# Patient Record
Sex: MALE
[De-identification: names, ages, dates, MRNs, and addresses within clinical notes are randomized per-mention and may not be internally consistent; named-entity substitution may affect disease eponyms.]

---

## 2024-08-09 PROBLEM — Z00.00 ENCOUNTER FOR PREVENTIVE HEALTH EXAMINATION: Status: ACTIVE | Noted: 2024-08-09

## 2024-08-15 PROBLEM — N18.6 ESRD ON HEMODIALYSIS: Status: ACTIVE | Noted: 2024-08-15

## 2024-08-15 PROBLEM — I10 HTN (HYPERTENSION): Status: ACTIVE | Noted: 2024-08-15

## 2024-08-15 NOTE — REASON FOR VISIT
[Other ___] : a [unfilled] visit for [Difficult Cannulation] : difficult cannulation [High Venous Pressure] : high venous pressure

## 2024-08-16 ENCOUNTER — RESULT REVIEW (OUTPATIENT)
Age: 54
End: 2024-08-16

## 2024-08-16 ENCOUNTER — APPOINTMENT (OUTPATIENT)
Dept: ENDOVASCULAR SURGERY | Facility: CLINIC | Age: 54
End: 2024-08-16
Payer: MEDICAID

## 2024-08-16 VITALS
TEMPERATURE: 98.1 F | RESPIRATION RATE: 16 BRPM | HEIGHT: 68 IN | SYSTOLIC BLOOD PRESSURE: 131 MMHG | BODY MASS INDEX: 30.07 KG/M2 | WEIGHT: 198.41 LBS | HEART RATE: 68 BPM | OXYGEN SATURATION: 99 % | DIASTOLIC BLOOD PRESSURE: 77 MMHG

## 2024-08-16 DIAGNOSIS — T82.898A OTHER SPECIFIED COMPLICATION OF VASCULAR PROSTHETIC DEVICES, IMPLANTS AND GRAFTS, INITIAL ENCOUNTER: ICD-10-CM

## 2024-08-16 DIAGNOSIS — I50.9 HEART FAILURE, UNSPECIFIED: ICD-10-CM

## 2024-08-16 DIAGNOSIS — N18.6 END STAGE RENAL DISEASE: ICD-10-CM

## 2024-08-16 DIAGNOSIS — Z99.2 END STAGE RENAL DISEASE: ICD-10-CM

## 2024-08-16 DIAGNOSIS — I10 ESSENTIAL (PRIMARY) HYPERTENSION: ICD-10-CM

## 2024-08-16 PROCEDURE — 36907Z: CUSTOM | Mod: 59

## 2024-08-16 PROCEDURE — 36902Z: CUSTOM

## 2024-08-16 RX ORDER — CINACALCET 30 MG/1
30 TABLET ORAL
Refills: 0 | Status: DISCONTINUED | COMMUNITY
End: 2024-08-16

## 2024-08-16 RX ORDER — ASPIRIN 81 MG
81 TABLET, DELAYED RELEASE (ENTERIC COATED) ORAL
Refills: 0 | Status: ACTIVE | COMMUNITY

## 2024-08-16 RX ORDER — GABAPENTIN 300 MG
300 TABLET ORAL
Refills: 0 | Status: ACTIVE | COMMUNITY

## 2024-08-16 RX ORDER — SUCROFERRIC OXYHYDROXIDE 500 MG/1
500 TABLET, CHEWABLE ORAL
Refills: 0 | Status: ACTIVE | COMMUNITY

## 2024-08-16 RX ORDER — ATORVASTATIN CALCIUM 20 MG/1
20 TABLET, FILM COATED ORAL
Refills: 0 | Status: ACTIVE | COMMUNITY

## 2024-08-16 RX ORDER — CLONIDINE HYDROCHLORIDE 0.2 MG/1
0.2 TABLET ORAL
Refills: 0 | Status: DISCONTINUED | COMMUNITY
End: 2024-08-16

## 2024-08-16 RX ORDER — NIFEDIPINE 60 MG
60 TABLET, EXTENDED RELEASE ORAL
Refills: 0 | Status: ACTIVE | COMMUNITY

## 2024-08-16 RX ORDER — CARVEDILOL 6.25 MG/1
6.25 TABLET, FILM COATED ORAL
Refills: 0 | Status: ACTIVE | COMMUNITY

## 2024-08-16 RX ORDER — SACUBITRIL AND VALSARTAN 49; 51 MG/1; MG/1
49-51 TABLET, FILM COATED ORAL
Refills: 0 | Status: ACTIVE | COMMUNITY

## 2024-08-16 NOTE — PAST MEDICAL HISTORY
[Increasing age ( >40 years old)] : Increasing age ( >40 years old) [No therapy indicated for cases scheduled for less than one hour] : No therapy indicated for cases scheduled for less than one hour. [FreeTextEntry1] : Malignant Hyperthermia Screening Tool and Risk of Bleeding Assessment  Mr. ELVER CORREA denies family history of unexpected death following Anesthesia or Exercise. Denies Family history of Malignant Hyperthermia, Muscle or Neuromuscular disorder and High Temperature following exercise.  Mr. ELVER CORREA denies history of Muscle Spasm, Dark or Chocolate - Colored urine and Unanticipated fever immediately following anesthesia or serious exercise.  Mr. CORREA also denies bleeding tendencies/ Risks of Bleeding.

## 2024-08-16 NOTE — ASSESSMENT
[FreeTextEntry1] : referred from dialysis for high venous pressures and difficult cannulation-plan for fistulogram and possible intervention

## 2024-08-16 NOTE — PROCEDURE
[Resume diet] : resume diet [Site check for bleeding/hematoma/thrill/bruit] : Site check for bleeding/hematoma/thrill/bruit [Vital signs on admission the q 15 mins x2] : Vital signs on admission the q 15 mins x2 [FreeTextEntry1] : left arm fistula fistulogram /angioplasty

## 2024-08-16 NOTE — HISTORY OF PRESENT ILLNESS
[] : left radiocephalic fistula [FreeTextEntry1] : 6/11/2018 Dr. Hurtado [FreeTextEntry4] : yesterday  [FreeTextEntry5] : yesterday at 12midnight [FreeTextEntry6] : Dr. Whitlock

## 2024-09-06 ENCOUNTER — OUTPATIENT (OUTPATIENT)
Dept: OUTPATIENT SERVICES | Facility: HOSPITAL | Age: 54
LOS: 1 days | End: 2024-09-06
Payer: MEDICAID

## 2024-09-06 VITALS — WEIGHT: 195.99 LBS | HEIGHT: 68 IN

## 2024-09-06 DIAGNOSIS — Z98.890 OTHER SPECIFIED POSTPROCEDURAL STATES: Chronic | ICD-10-CM

## 2024-09-06 DIAGNOSIS — Z01.818 ENCOUNTER FOR OTHER PREPROCEDURAL EXAMINATION: ICD-10-CM

## 2024-09-06 DIAGNOSIS — T82.898A OTHER SPECIFIED COMPLICATION OF VASCULAR PROSTHETIC DEVICES, IMPLANTS AND GRAFTS, INITIAL ENCOUNTER: ICD-10-CM

## 2024-09-06 DIAGNOSIS — I10 ESSENTIAL (PRIMARY) HYPERTENSION: ICD-10-CM

## 2024-09-06 LAB
ANION GAP SERPL CALC-SCNC: 14 MMOL/L — SIGNIFICANT CHANGE UP (ref 5–17)
BUN SERPL-MCNC: 41 MG/DL — HIGH (ref 7–23)
CALCIUM SERPL-MCNC: 11.1 MG/DL — HIGH (ref 8.4–10.5)
CHLORIDE SERPL-SCNC: 94 MMOL/L — LOW (ref 96–108)
CO2 SERPL-SCNC: 28 MMOL/L — SIGNIFICANT CHANGE UP (ref 22–31)
CREAT SERPL-MCNC: 9.21 MG/DL — HIGH (ref 0.5–1.3)
EGFR: 6 ML/MIN/1.73M2 — LOW
GLUCOSE SERPL-MCNC: 101 MG/DL — HIGH (ref 70–99)
HCT VFR BLD CALC: 39.9 % — SIGNIFICANT CHANGE UP (ref 39–50)
HCV AB S/CO SERPL IA: 0.05 S/CO — SIGNIFICANT CHANGE UP
HCV AB SERPL-IMP: SIGNIFICANT CHANGE UP
HGB BLD-MCNC: 12.8 G/DL — LOW (ref 13–17)
MCHC RBC-ENTMCNC: 30.4 PG — SIGNIFICANT CHANGE UP (ref 27–34)
MCHC RBC-ENTMCNC: 32.1 GM/DL — SIGNIFICANT CHANGE UP (ref 32–36)
MCV RBC AUTO: 94.8 FL — SIGNIFICANT CHANGE UP (ref 80–100)
NRBC # BLD: 0 /100 WBCS — SIGNIFICANT CHANGE UP (ref 0–0)
PLATELET # BLD AUTO: 169 K/UL — SIGNIFICANT CHANGE UP (ref 150–400)
POTASSIUM SERPL-MCNC: 3.8 MMOL/L — SIGNIFICANT CHANGE UP (ref 3.5–5.3)
POTASSIUM SERPL-SCNC: 3.8 MMOL/L — SIGNIFICANT CHANGE UP (ref 3.5–5.3)
RBC # BLD: 4.21 M/UL — SIGNIFICANT CHANGE UP (ref 4.2–5.8)
RBC # FLD: 14.6 % — HIGH (ref 10.3–14.5)
SODIUM SERPL-SCNC: 136 MMOL/L — SIGNIFICANT CHANGE UP (ref 135–145)
WBC # BLD: 6.44 K/UL — SIGNIFICANT CHANGE UP (ref 3.8–10.5)
WBC # FLD AUTO: 6.44 K/UL — SIGNIFICANT CHANGE UP (ref 3.8–10.5)

## 2024-09-06 PROCEDURE — 80048 BASIC METABOLIC PNL TOTAL CA: CPT

## 2024-09-06 PROCEDURE — 36415 COLL VENOUS BLD VENIPUNCTURE: CPT

## 2024-09-06 PROCEDURE — G0463: CPT

## 2024-09-06 PROCEDURE — 86803 HEPATITIS C AB TEST: CPT

## 2024-09-06 PROCEDURE — 85027 COMPLETE CBC AUTOMATED: CPT

## 2024-09-06 RX ORDER — SACUBITRIL AND VALSARTAN 49; 51 MG/1; MG/1
0 TABLET, FILM COATED ORAL
Qty: 0 | Refills: 1 | DISCHARGE

## 2024-09-06 NOTE — H&P PST ADULT - ASSESSMENT
Airway:  normal    Mallampati-     2  Dental: Patient denies loose teeth    Activity :  dasi mets :

## 2024-09-06 NOTE — H&P PST ADULT - NSANTHOSAYNRD_GEN_A_CORE
No. SIVAKUMAR screening performed.  STOP BANG Legend: 0-2 = LOW Risk; 3-4 = INTERMEDIATE Risk; 5-8 = HIGH Risk

## 2024-09-06 NOTE — H&P PST ADULT - MUSCULOSKELETAL
+thrill +bruit LUE/ROM intact/no joint swelling/no joint erythema/no joint warmth/normal gait/no chest wall tenderness details…

## 2024-09-06 NOTE — H&P PST ADULT - PROBLEM SELECTOR PLAN 2
Continue on antihypertensive medication   continue on aspirin 81 mg throughout the leslie operative period

## 2024-09-06 NOTE — H&P PST ADULT - HISTORY OF PRESENT ILLNESS
54 year old male presents to PST prior to scheduled revision of left arm AV fistula on 9/11/24 with Dr Arceo.   Fistula placed June 2018 (Dr. Hurtado)   PMHx includes ESRD, HTN, HLD,

## 2024-09-06 NOTE — H&P PST ADULT - PROBLEM SELECTOR PLAN 1
Revision of left arm AV fistula with repair of aneurysm  CBC BMP in PST   Instructions, diet and chlorhexidine soap reviewed and provided to patient   all questions answered during exam

## 2024-09-06 NOTE — H&P PST ADULT - RESPIRATORY
clear to auscultation bilaterally/no wheezes/no rales/no rhonchi/no respiratory distress/no use of accessory muscles/no subcutaneous emphysema/airway patent/good air movement/respirations non-labored/no intercostal retractions

## 2024-09-10 ENCOUNTER — RESULT REVIEW (OUTPATIENT)
Age: 54
End: 2024-09-10

## 2024-09-10 ENCOUNTER — APPOINTMENT (OUTPATIENT)
Dept: ENDOVASCULAR SURGERY | Facility: CLINIC | Age: 54
End: 2024-09-10
Payer: MEDICAID

## 2024-09-10 ENCOUNTER — TRANSCRIPTION ENCOUNTER (OUTPATIENT)
Age: 54
End: 2024-09-10

## 2024-09-10 VITALS
BODY MASS INDEX: 30.07 KG/M2 | RESPIRATION RATE: 18 BRPM | HEART RATE: 58 BPM | WEIGHT: 198.41 LBS | HEIGHT: 68 IN | DIASTOLIC BLOOD PRESSURE: 78 MMHG | TEMPERATURE: 98.6 F | SYSTOLIC BLOOD PRESSURE: 160 MMHG | OXYGEN SATURATION: 100 %

## 2024-09-10 DIAGNOSIS — Z99.2 END STAGE RENAL DISEASE: ICD-10-CM

## 2024-09-10 DIAGNOSIS — N18.6 END STAGE RENAL DISEASE: ICD-10-CM

## 2024-09-10 PROCEDURE — 76937 US GUIDE VASCULAR ACCESS: CPT

## 2024-09-10 PROCEDURE — 36558 INSERT TUNNELED CV CATH: CPT | Mod: RT

## 2024-09-10 PROCEDURE — 77001 FLUOROGUIDE FOR VEIN DEVICE: CPT

## 2024-09-10 NOTE — HISTORY OF PRESENT ILLNESS
[] : left radiocephalic fistula [FreeTextEntry1] : 6/11/2018 Dr. Hurtado [FreeTextEntry4] : Saturday  [FreeTextEntry5] : yesterday at 10:30pm  [FreeTextEntry6] : Dr. Grover

## 2024-09-10 NOTE — PROCEDURE
[D/C IV on discharge] : D/C IV on discharge [Resume diet] : resume diet [Site check for bleeding/hematoma] : Site check for bleeding/hematoma [Vital signs on admission the q 15 mins x2] : Vital signs on admission the q 15 mins x2 [FreeTextEntry1] : right tunneled catheter insertion

## 2024-09-10 NOTE — REASON FOR VISIT
[Other ___] : a [unfilled] visit for [Family Member] : family member [FreeTextEntry2] : tunneled catheter insertion

## 2024-09-10 NOTE — ASSESSMENT
[FreeTextEntry1] : scheduled for fistula surgery-plan for tunneled catheter insertion for dialysis
[FreeTextEntry1] : scheduled for fistula surgery-plan for tunneled catheter insertion for dialysis
within normal limits

## 2024-09-11 ENCOUNTER — TRANSCRIPTION ENCOUNTER (OUTPATIENT)
Age: 54
End: 2024-09-11

## 2024-09-11 ENCOUNTER — APPOINTMENT (OUTPATIENT)
Dept: VASCULAR SURGERY | Facility: HOSPITAL | Age: 54
End: 2024-09-11
Payer: MEDICAID

## 2024-09-11 ENCOUNTER — OUTPATIENT (OUTPATIENT)
Dept: INPATIENT UNIT | Facility: HOSPITAL | Age: 54
LOS: 1 days | End: 2024-09-11
Payer: MEDICAID

## 2024-09-11 ENCOUNTER — RESULT REVIEW (OUTPATIENT)
Age: 54
End: 2024-09-11

## 2024-09-11 VITALS
DIASTOLIC BLOOD PRESSURE: 72 MMHG | OXYGEN SATURATION: 98 % | HEART RATE: 70 BPM | SYSTOLIC BLOOD PRESSURE: 138 MMHG | WEIGHT: 195.99 LBS | HEIGHT: 68 IN | RESPIRATION RATE: 17 BRPM | TEMPERATURE: 98 F

## 2024-09-11 VITALS
SYSTOLIC BLOOD PRESSURE: 159 MMHG | OXYGEN SATURATION: 100 % | RESPIRATION RATE: 16 BRPM | DIASTOLIC BLOOD PRESSURE: 82 MMHG | HEART RATE: 73 BPM

## 2024-09-11 DIAGNOSIS — T82.898A OTHER SPECIFIED COMPLICATION OF VASCULAR PROSTHETIC DEVICES, IMPLANTS AND GRAFTS, INITIAL ENCOUNTER: ICD-10-CM

## 2024-09-11 DIAGNOSIS — Z98.890 OTHER SPECIFIED POSTPROCEDURAL STATES: Chronic | ICD-10-CM

## 2024-09-11 LAB — POTASSIUM BLDV-SCNC: 4.4 MMOL/L — SIGNIFICANT CHANGE UP (ref 3.5–5.1)

## 2024-09-11 PROCEDURE — 88305 TISSUE EXAM BY PATHOLOGIST: CPT

## 2024-09-11 PROCEDURE — C1889: CPT

## 2024-09-11 PROCEDURE — 36833 AV FISTULA REVISION: CPT

## 2024-09-11 PROCEDURE — C9399: CPT

## 2024-09-11 PROCEDURE — 36832 AV FISTULA REVISION OPEN: CPT | Mod: LT,58

## 2024-09-11 PROCEDURE — 84132 ASSAY OF SERUM POTASSIUM: CPT

## 2024-09-11 PROCEDURE — 88305 TISSUE EXAM BY PATHOLOGIST: CPT | Mod: 26

## 2024-09-11 DEVICE — SURGIFOAM PAD 8CM X 12.5CM X 10MM (100): Type: IMPLANTABLE DEVICE | Site: LEFT | Status: FUNCTIONAL

## 2024-09-11 DEVICE — AGENT HEMOSTATIC HEMOBLAST 1.65G 10CM: Type: IMPLANTABLE DEVICE | Site: LEFT | Status: FUNCTIONAL

## 2024-09-11 DEVICE — STAPLER COVIDIEN TRI-STAPLE 60MM BLACK INTELLIGENT RELOAD: Type: IMPLANTABLE DEVICE | Site: LEFT | Status: FUNCTIONAL

## 2024-09-11 DEVICE — CLIP APPLIER COVIDIEN SURGICLIP II 9.75" MEDIUM: Type: IMPLANTABLE DEVICE | Site: LEFT | Status: FUNCTIONAL

## 2024-09-11 DEVICE — STAPLER COVIDIEN TRI-STAPLE 60MM BLACK RELOAD: Type: IMPLANTABLE DEVICE | Site: LEFT | Status: FUNCTIONAL

## 2024-09-11 DEVICE — ARISTA 3GR: Type: IMPLANTABLE DEVICE | Site: LEFT | Status: FUNCTIONAL

## 2024-09-11 DEVICE — STAPLER COVIDIEN TRI-STAPLE 45MM BLACK RELOAD: Type: IMPLANTABLE DEVICE | Site: LEFT | Status: FUNCTIONAL

## 2024-09-11 DEVICE — CLIP APPLIER COVIDIEN SURGICLIP III 9" SM: Type: IMPLANTABLE DEVICE | Site: LEFT | Status: FUNCTIONAL

## 2024-09-11 RX ORDER — CHLORHEXIDINE GLUCONATE 40 MG/ML
1 SOLUTION TOPICAL ONCE
Refills: 0 | Status: DISCONTINUED | OUTPATIENT
Start: 2024-09-11 | End: 2024-09-11

## 2024-09-11 RX ORDER — CARVEDILOL 6.25 MG/1
0 TABLET ORAL
Qty: 0 | Refills: 0 | DISCHARGE

## 2024-09-11 RX ORDER — OXYCODONE HYDROCHLORIDE 5 MG/1
1 TABLET ORAL
Qty: 5 | Refills: 0
Start: 2024-09-11

## 2024-09-11 RX ORDER — SODIUM CHLORIDE 9 MG/ML
3 INJECTION INTRAMUSCULAR; INTRAVENOUS; SUBCUTANEOUS EVERY 8 HOURS
Refills: 0 | Status: DISCONTINUED | OUTPATIENT
Start: 2024-09-11 | End: 2024-09-11

## 2024-09-11 RX ORDER — ASPIRIN 81 MG
0 TABLET, DELAYED RELEASE (ENTERIC COATED) ORAL
Qty: 0 | Refills: 1 | DISCHARGE

## 2024-09-11 RX ORDER — GABAPENTIN 100 MG
0 CAPSULE ORAL
Qty: 0 | Refills: 1 | DISCHARGE

## 2024-09-11 RX ORDER — CEFAZOLIN SODIUM 2 G/100ML
2000 INJECTION, SOLUTION INTRAVENOUS ONCE
Refills: 0 | Status: COMPLETED | OUTPATIENT
Start: 2024-09-11 | End: 2024-09-11

## 2024-09-11 RX ORDER — HYDROMORPHONE HYDROCHLORIDE 2 MG/1
0.5 TABLET ORAL
Refills: 0 | Status: DISCONTINUED | OUTPATIENT
Start: 2024-09-11 | End: 2024-09-11

## 2024-09-11 RX ORDER — LIDOCAINE HCL 20 MG/ML
0.2 VIAL (ML) INJECTION ONCE
Refills: 0 | Status: DISCONTINUED | OUTPATIENT
Start: 2024-09-11 | End: 2024-09-11

## 2024-09-11 RX ORDER — ONDANSETRON 2 MG/ML
4 INJECTION, SOLUTION INTRAMUSCULAR; INTRAVENOUS ONCE
Refills: 0 | Status: DISCONTINUED | OUTPATIENT
Start: 2024-09-11 | End: 2024-09-11

## 2024-09-11 RX ORDER — SUCROFERRIC OXYHYDROXIDE 500 MG/1
0 TABLET, CHEWABLE ORAL
Qty: 0 | Refills: 3 | DISCHARGE

## 2024-09-11 RX ORDER — NIFEDIPINE 60 MG/1
0 TABLET, FILM COATED, EXTENDED RELEASE ORAL
Qty: 0 | Refills: 4 | DISCHARGE

## 2024-09-11 RX ADMIN — HYDROMORPHONE HYDROCHLORIDE 0.5 MILLIGRAM(S): 2 TABLET ORAL at 09:30

## 2024-09-11 RX ADMIN — HYDROMORPHONE HYDROCHLORIDE 0.5 MILLIGRAM(S): 2 TABLET ORAL at 09:45

## 2024-09-11 NOTE — BRIEF OPERATIVE NOTE - NSICDXBRIEFPOSTOP_GEN_ALL_CORE_FT
POST-OP DIAGNOSIS:  Aneurysm of arteriovenous dialysis fistula 11-Sep-2024 09:45:50  Huong Sullivan

## 2024-09-11 NOTE — PROVIDER CONTACT NOTE (OTHER) - ASSESSMENT
Permacath dressing noted to be saturated and Permacath dressing noted to be saturated with sanguinous color drainage and site continues to ooze

## 2024-09-11 NOTE — BRIEF OPERATIVE NOTE - NSICDXBRIEFPREOP_GEN_ALL_CORE_FT
PRE-OP DIAGNOSIS:  Aneurysm of arteriovenous dialysis fistula 11-Sep-2024 09:45:39  Huong Sullivan

## 2024-09-11 NOTE — PRE-ANESTHESIA EVALUATION ADULT - NSANSTRISK2NDMDRD_GEN_ALL_CORE
[] [address]  [address] [facility] [place] [facility].  [phone]  F: [phone] [] [address] [facility]  [address]   [address]  P: [phone]  F: [phone] [] [address]  [address] [facility]  P: [phone]  F: [phone] [] [place]: [phone]  F: [phone] [] [address] [facility]  [address] [facility]   [address]   P: [phone]  F: [phone]  [] [address] [facility].   P: [phone]  F: [phone] [x] [address] [facility]  [address]    [address]  P: [phone]  F: [phone] [] [address] [facility]  [address] [facility]  [place]: [phone]  F: [phone] [] [address]  [place]: [phone]  F: [phone]      Physical Therapy Daily Treatment Note    Date:  2023  Patient Name:  Nilesh Hernandez    :  1942  MRN: [id number]  Physician: Guanako Hicks MD                               Insurance: Prior auth required after evaluation needed for outpt physical therapy with [facility] ([facility])  23  8 visits approved 23--24  Medical Diagnosis: R26.81   Gait Instability;  G62.9, R29.898                        Rehab Codes: R26.81, M54.50, R26.89  Onset Date: 2021                                  Next 's appt: TBD  Visit# / total visits:   visit                    Cancels/No Shows: 0/0; Progress note due at visit  (approved 8 visits 23-24)  Cancels/No Shows: 0/0    Subjective:     Comments:Patient reports she has more
Anesthesia risk alerts addressed and discussed with second provider

## 2024-09-11 NOTE — ASU DISCHARGE PLAN (ADULT/PEDIATRIC) - ASU DC SPECIAL INSTRUCTIONSFT
Please keep ACE bandage on until Friday.    Your incision is covered with clear paper tape called Steri-strips. Do not remove Steri-strips. They will fall off on their own.  After showering, please pat Steri-strips dry. After surgery, some blood may escape from under the tape. The Steri-strips may fall off after several days. If not, they will be removed in the office.     Please see Dr. Arceo in the office at your originally scheduled appointment.     You may use Tylenol or Motrin for pain control every 6 hours, with oxycodone as needed if pain is not controlled with the Tylenol/Motrin. We suggest staggering the Tylenol/Motrin every 3 hours for maximum pain relief. You should call the doctor's office if you develop intractable nausea, vomiting, or pain that cannot be controlled with oxycodone. If the doctor's office is not open or you feel this is an emergency, please call 911 or visit the nearest emergency room.

## 2024-09-11 NOTE — BRIEF OPERATIVE NOTE - OPERATION/FINDINGS
aneurysmal LUE AVF. 2 aneurysm sacs dissected from surrounding tissue. Inflow clamped and aneurysm stapled across with EndoGIA Purple loads (60 x2, 45 x1). The skin and subcutaneous tissue were closed in layers with suture. Palpable thrill at conclusion of case.

## 2024-09-11 NOTE — PROVIDER CONTACT NOTE (OTHER) - RECOMMENDATIONS
Dr. Arceo at bedside. Pt dressing and permacath site will be addressed once patient goes into operating room.

## 2024-09-11 NOTE — ASU DISCHARGE PLAN (ADULT/PEDIATRIC) - NS MD DC FALL RISK RISK
For information on Fall & Injury Prevention, visit: [email or web address] OR  [email or web address] OR  https://www.cdc.gov/steadi/patient.html

## 2024-09-11 NOTE — PROVIDER CONTACT NOTE (OTHER) - SITUATION
Pt here in [facility] today for Left arm fistula revision with repair or aneurysm Pt here in S[facility] today for Left arm fistula revision with repair of aneurysm

## 2024-09-11 NOTE — PRE-OP CHECKLIST - VERIFY SURGICAL SITE/SIDE WITH PATIENT
B12/F within normal limits.
Iron deficiency anemia.  Hgb is stable vs 3 weeks ago.  ALC 0.8 (stable vs 3 weeks ago).   Recommend iron sulfate 325 mg supplement using 1 tab twice daily.      Note:   Last colonoscopy 4/9/19 with tubular adenoma (advised 5 year follow-up).  B12/F pending.  Pt still hasn't done the ordered FOBT.    Pt is advised to do the FOBT or see GI in consultation (in addition to the iron supplementation).  
done

## 2024-09-11 NOTE — ASU DISCHARGE PLAN (ADULT/PEDIATRIC) - CARE PROVIDER_API CALL
Mayito Arceo  Vascular Surgery  [address] [facility], [address]  [place] [address]  Phone: [phone]  Fax: [phone]  Follow Up Time:

## 2024-09-11 NOTE — PROVIDER CONTACT NOTE (OTHER) - REASON
Pt right permacath port oozing sanguinous color Pt right permacath port oozing sanguinous color drainage

## 2024-09-12 ENCOUNTER — APPOINTMENT (OUTPATIENT)
Dept: ENDOVASCULAR SURGERY | Facility: CLINIC | Age: 54
End: 2024-09-12

## 2024-09-12 PROBLEM — N18.6 END STAGE RENAL DISEASE: Chronic | Status: ACTIVE | Noted: 2024-09-06

## 2024-09-12 PROBLEM — I10 ESSENTIAL (PRIMARY) HYPERTENSION: Chronic | Status: ACTIVE | Noted: 2024-09-06

## 2024-09-12 PROBLEM — E78.5 HYPERLIPIDEMIA, UNSPECIFIED: Chronic | Status: ACTIVE | Noted: 2024-09-06

## 2024-09-15 ENCOUNTER — EMERGENCY (EMERGENCY)
Facility: HOSPITAL | Age: 54
LOS: 1 days | Discharge: HOME | End: 2024-09-15
Attending: EMERGENCY MEDICINE
Payer: MEDICAID

## 2024-09-15 VITALS
HEIGHT: 68 IN | DIASTOLIC BLOOD PRESSURE: 77 MMHG | TEMPERATURE: 99 F | OXYGEN SATURATION: 99 % | RESPIRATION RATE: 14 BRPM | SYSTOLIC BLOOD PRESSURE: 167 MMHG | WEIGHT: 198.42 LBS | HEART RATE: 75 BPM

## 2024-09-15 VITALS
TEMPERATURE: 97 F | HEART RATE: 75 BPM | OXYGEN SATURATION: 98 % | SYSTOLIC BLOOD PRESSURE: 179 MMHG | DIASTOLIC BLOOD PRESSURE: 69 MMHG | RESPIRATION RATE: 15 BRPM

## 2024-09-15 DIAGNOSIS — Z98.890 OTHER SPECIFIED POSTPROCEDURAL STATES: Chronic | ICD-10-CM

## 2024-09-15 LAB
ALBUMIN SERPL ELPH-MCNC: 4 G/DL — SIGNIFICANT CHANGE UP (ref 3.3–5)
ALP SERPL-CCNC: 103 U/L — SIGNIFICANT CHANGE UP (ref 40–120)
ALT FLD-CCNC: 12 U/L — SIGNIFICANT CHANGE UP (ref 10–45)
ANION GAP SERPL CALC-SCNC: 14 MMOL/L — SIGNIFICANT CHANGE UP (ref 5–17)
APTT BLD: 28.9 SEC — SIGNIFICANT CHANGE UP (ref 24.5–35.6)
AST SERPL-CCNC: 16 U/L — SIGNIFICANT CHANGE UP (ref 10–40)
BILIRUB SERPL-MCNC: 0.6 MG/DL — SIGNIFICANT CHANGE UP (ref 0.2–1.2)
BUN SERPL-MCNC: 52 MG/DL — HIGH (ref 7–23)
CALCIUM SERPL-MCNC: 10.9 MG/DL — HIGH (ref 8.4–10.5)
CHLORIDE SERPL-SCNC: 96 MMOL/L — SIGNIFICANT CHANGE UP (ref 96–108)
CO2 SERPL-SCNC: 31 MMOL/L — SIGNIFICANT CHANGE UP (ref 22–31)
CREAT SERPL-MCNC: 9.67 MG/DL — HIGH (ref 0.5–1.3)
EGFR: 6 ML/MIN/1.73M2 — LOW
GLUCOSE SERPL-MCNC: 117 MG/DL — HIGH (ref 70–99)
HCT VFR BLD CALC: 36.1 % — LOW (ref 39–50)
HGB BLD-MCNC: 11.5 G/DL — LOW (ref 13–17)
INR BLD: 0.98 RATIO — SIGNIFICANT CHANGE UP (ref 0.85–1.18)
MCHC RBC-ENTMCNC: 30.5 PG — SIGNIFICANT CHANGE UP (ref 27–34)
MCHC RBC-ENTMCNC: 31.9 GM/DL — LOW (ref 32–36)
MCV RBC AUTO: 95.8 FL — SIGNIFICANT CHANGE UP (ref 80–100)
NRBC # BLD: 0 /100 WBCS — SIGNIFICANT CHANGE UP (ref 0–0)
PLATELET # BLD AUTO: 170 K/UL — SIGNIFICANT CHANGE UP (ref 150–400)
POTASSIUM SERPL-MCNC: 4.1 MMOL/L — SIGNIFICANT CHANGE UP (ref 3.5–5.3)
POTASSIUM SERPL-SCNC: 4.1 MMOL/L — SIGNIFICANT CHANGE UP (ref 3.5–5.3)
PROT SERPL-MCNC: 7.4 G/DL — SIGNIFICANT CHANGE UP (ref 6–8.3)
PROTHROM AB SERPL-ACNC: 10.8 SEC — SIGNIFICANT CHANGE UP (ref 9.5–13)
RBC # BLD: 3.77 M/UL — LOW (ref 4.2–5.8)
RBC # FLD: 14.3 % — SIGNIFICANT CHANGE UP (ref 10.3–14.5)
SODIUM SERPL-SCNC: 141 MMOL/L — SIGNIFICANT CHANGE UP (ref 135–145)
WBC # BLD: 9.88 K/UL — SIGNIFICANT CHANGE UP (ref 3.8–10.5)
WBC # FLD AUTO: 9.88 K/UL — SIGNIFICANT CHANGE UP (ref 3.8–10.5)

## 2024-09-15 PROCEDURE — 93990 DOPPLER FLOW TESTING: CPT

## 2024-09-15 PROCEDURE — 93990 DOPPLER FLOW TESTING: CPT | Mod: 26

## 2024-09-15 PROCEDURE — 80053 COMPREHEN METABOLIC PANEL: CPT

## 2024-09-15 PROCEDURE — 36000 PLACE NEEDLE IN VEIN: CPT

## 2024-09-15 PROCEDURE — 85027 COMPLETE CBC AUTOMATED: CPT

## 2024-09-15 PROCEDURE — 85730 THROMBOPLASTIN TIME PARTIAL: CPT

## 2024-09-15 PROCEDURE — 99285 EMERGENCY DEPT VISIT HI MDM: CPT

## 2024-09-15 PROCEDURE — 99284 EMERGENCY DEPT VISIT MOD MDM: CPT | Mod: 25

## 2024-09-15 PROCEDURE — 85610 PROTHROMBIN TIME: CPT

## 2024-09-15 PROCEDURE — 36415 COLL VENOUS BLD VENIPUNCTURE: CPT

## 2024-09-15 NOTE — ED ADULT NURSE NOTE - CCCP TRG CHIEF CMPLNT
· Wear knee-high compression stockings during the day and take them off at night  · Follow low-sodium diet (2000 to 2500 mg of sodium a day or less)  · Elevate the legs when sitting or resting  · Will get a x-ray of the neck, will potentially refer to orthopedic after this x-ray is done  · Make an appointment with orthopedic for your hand pain dialysis cath bleeding/wound check

## 2024-09-15 NOTE — CONSULT NOTE ADULT - SUBJECTIVE AND OBJECTIVE BOX
VASCULAR SURGERY CONSULT NOTE  --------------------------------------------------------------------------------------------  HPI:  54M with PMH of HTN, HLD, and ESRD on HD currently through [facility] and now s/p LUE AVF aneurysm repair on 9/11 presenting after having bleeding episodes from the fistula site. Reports on Thursday night he had some dressing saturation with blood, the dialysis center changed the dressing and did not find anything concerning, however, patient reports had two additional episodes of bleeding from the site that soaked the dressing and dripped down his arm. Denies fever, chills, palpitations, lightheadedness, dizziness.    In ED, patient is afebrile, hemodynamically stable.      PAST MEDICAL & SURGICAL HISTORY:  ESRD on dialysis      HTN (hypertension)      HLD (hyperlipidemia)      S/P arteriovenous (AV) fistula creation      H/O colonoscopy        FAMILY HISTORY:  [] Family history not pertinent as reviewed with the patient and family    SOCIAL HISTORY:  ***    ALLERGIES: No Known Allergies      HOME MEDICATIONS: ***    CURRENT MEDICATIONS  MEDICATIONS (STANDING):   MEDICATIONS (PRN):  --------------------------------------------------------------------------------------------    Vitals:   T(C): 37.1 (09-15-24 @ 09:50), Max: 37.1 (09-15-24 @ 09:50)  HR: 75 (09-15-24 @ 09:50) (75 - 75)  BP: 167/77 (09-15-24 @ 09:50) (167/77 - 167/77)  RR: 14 (09-15-24 @ 09:50) (14 - 14)  SpO2: 99% (09-15-24 @ 09:50) (99% - 99%)  CAPILLARY BLOOD GLUCOSE          Height (cm): 172.7 (09-15 @ 09:50)  Weight (kg): 90 (09-15 @ 09:50)  BMI (kg/m2): 30.2 (09-15 @ 09:50)  BSA (m2): 2.04 (09-15 @ 09:50)      PHYSICAL EXAM:  General: NAD, Lying in bed comfortably  Neuro: Alert and answering questions appropriately   Resp: Good effort, no signs of respiratory distress  Vascular: All 4 extremities warm, palpable thrill at LUE AVF, 2+ LUE radial, no active bleeding, small amount of sanguinous saturation on dressing  Musculoskeletal: All 4 extremities moving spontaneously, no limitations  --------------------------------------------------------------------------------------------    LABS                --------------------------------------------------------------------------------------------    MICROBIOLOGY      --------------------------------------------------------------------------------------------    IMAGING      --------------------------------------------------------------------------------------------

## 2024-09-15 NOTE — ED PROVIDER NOTE - PROGRESS NOTE DETAILS
discussed L fistula duplex results w/ vasc surg. will come by to dress wound w/ surgicel. also noticed mild saturation of R tunneled catheter dressing.

## 2024-09-15 NOTE — ED PROVIDER NOTE - OBJECTIVE STATEMENT
This is a 53 y/o M with PMHx of ESRD on HD, HTN and HLD who presented for L arm fistula bleeding. Patient underwent recent L arm AV fistula revision w/ Dr. Arceo on 9/11. He noted multiple episodes of bleeding from the site, requiring multiple dressing changes. Last HD session yesterday from R tunneled catheter. This is a 55 y/o M with PMHx of ESRD on HD, HTN and HLD who presented for L arm fistula bleeding. Patient underwent L arm AV fistula revision w/ Dr. Arceo on 9/11. He noted multiple episodes of bleeding from the site, requiring multiple dressing changes. Last HD session yesterday from R tunneled catheter.

## 2024-09-15 NOTE — ED PROVIDER NOTE - ATTENDING CONTRIBUTION TO CARE
53 yo male hx of recent LUE fistula revision ~5d ago with Adis (vascular) now p/w intermittent bleeding from revision site.  currently has dressing on fistula, not saturated, recently changed.    otherwise nontoxic on exam.  vascular surgery consulted, will see patient.  requesting fistula ultrasound,  follow-up and reassess, dispo per their recc's.

## 2024-09-15 NOTE — ED PROVIDER NOTE - CLINICAL SUMMARY MEDICAL DECISION MAKING FREE TEXT BOX
Patient w/ recent L AVF revision w/ Dr. Arceo who presented for evaluation of bleeding from site. Vascular surgery consulted to evaluate wound. Will order CBC, CMP, coags, type and screen and VA duplex of AVF. Patient w/ recent L AVF revision w/ Dr. Arceo who presented for evaluation of bleeding from site. Vascular surgery consulted to evaluate wound. Will order CBC, CMP, coags, type and screen and VA duplex of AVF.    see attending attestation authored by me, Reees Mccain MD, for further MDM details.

## 2024-09-15 NOTE — ED PROVIDER NOTE - NSFOLLOWUPINSTRUCTIONS_ED_ALL_ED_FT
You presented to the hospital for bleeding from the recently revised fistula site. Your blood counts are stable. The site was evaluated by the vascular surgeons who applied a medication to the area to decrease further episodes of bleeding. Please follow up with Dr. Arceo within the next few days. If you develop any dizziness, lightheadedness, worsening of bleed from the fistula, please seek medical attention.

## 2024-09-15 NOTE — ED ADULT NURSE NOTE - OBJECTIVE STATEMENT
The pt is a 54 Y m with a PMH of CKD. Pt came in today via [facility] for a wound check. Pt is AOx4 breathing evenly and spontaneously on  evenly on room air and able to speak in full sentences. Pt came in with a L AV fistula wound that was leaking blood for 2 days. wound came dressed in gauze and intact. site underdressed not assessed surgery was contacted. Pt states that the bleeding happened all day yesterday and  today before he came in. Pt denies head ache, sob, fevers, chills, N,V,D and vision changes, pt has full motor and sensory function intact. Pt was placed in a gown in a stretcher with comfort and saftey measures provided

## 2024-09-15 NOTE — ED PROVIDER NOTE - SKIN WOUND DESCRIPTION
dressing over L AVF, clean and dry. dressing over R tunneled catheter dressing over L AVF, clean and dry. dressing over R tunneled catheter w/ some saturation

## 2024-09-15 NOTE — CONSULT NOTE ADULT - ASSESSMENT
54M with PMH of HTN, HLD, and ESRD on HD currently through [facility] and now s/p LUE AVF aneurysm repair on 9/11 presenting after having bleeding episodes from the fistula site.    RECS:  - Please obtain set of labs  - LUE fistula duplex  - Dispo pending the above    Discussed with fellow on behalf of attending    Vascular Surgery [phone]

## 2024-09-16 ENCOUNTER — EMERGENCY (EMERGENCY)
Facility: HOSPITAL | Age: 54
LOS: 1 days | Discharge: HOME | End: 2024-09-16
Attending: EMERGENCY MEDICINE
Payer: MEDICAID

## 2024-09-16 VITALS
DIASTOLIC BLOOD PRESSURE: 92 MMHG | OXYGEN SATURATION: 100 % | SYSTOLIC BLOOD PRESSURE: 183 MMHG | HEART RATE: 71 BPM | RESPIRATION RATE: 20 BRPM | TEMPERATURE: 98 F

## 2024-09-16 VITALS
OXYGEN SATURATION: 97 % | HEART RATE: 70 BPM | SYSTOLIC BLOOD PRESSURE: 174 MMHG | TEMPERATURE: 98 F | WEIGHT: 198.42 LBS | RESPIRATION RATE: 16 BRPM | HEIGHT: 68 IN | DIASTOLIC BLOOD PRESSURE: 89 MMHG

## 2024-09-16 DIAGNOSIS — Z98.890 OTHER SPECIFIED POSTPROCEDURAL STATES: Chronic | ICD-10-CM

## 2024-09-16 LAB
ALBUMIN SERPL ELPH-MCNC: 4.1 G/DL — SIGNIFICANT CHANGE UP (ref 3.3–5)
ALP SERPL-CCNC: 100 U/L — SIGNIFICANT CHANGE UP (ref 40–120)
ALT FLD-CCNC: 16 U/L — SIGNIFICANT CHANGE UP (ref 10–45)
ANION GAP SERPL CALC-SCNC: 16 MMOL/L — SIGNIFICANT CHANGE UP (ref 5–17)
APTT BLD: 29.6 SEC — SIGNIFICANT CHANGE UP (ref 24.5–35.6)
AST SERPL-CCNC: 19 U/L — SIGNIFICANT CHANGE UP (ref 10–40)
BASOPHILS # BLD AUTO: 0.08 K/UL — SIGNIFICANT CHANGE UP (ref 0–0.2)
BASOPHILS NFR BLD AUTO: 0.8 % — SIGNIFICANT CHANGE UP (ref 0–2)
BILIRUB SERPL-MCNC: 0.7 MG/DL — SIGNIFICANT CHANGE UP (ref 0.2–1.2)
BUN SERPL-MCNC: 81 MG/DL — HIGH (ref 7–23)
CALCIUM SERPL-MCNC: 11.6 MG/DL — HIGH (ref 8.4–10.5)
CHLORIDE SERPL-SCNC: 94 MMOL/L — LOW (ref 96–108)
CO2 SERPL-SCNC: 26 MMOL/L — SIGNIFICANT CHANGE UP (ref 22–31)
CREAT SERPL-MCNC: 13.62 MG/DL — HIGH (ref 0.5–1.3)
EGFR: 4 ML/MIN/1.73M2 — LOW
EOSINOPHIL # BLD AUTO: 0.95 K/UL — HIGH (ref 0–0.5)
EOSINOPHIL NFR BLD AUTO: 9.2 % — HIGH (ref 0–6)
GLUCOSE SERPL-MCNC: 84 MG/DL — SIGNIFICANT CHANGE UP (ref 70–99)
HCT VFR BLD CALC: 37.6 % — LOW (ref 39–50)
HGB BLD-MCNC: 11.9 G/DL — LOW (ref 13–17)
IMM GRANULOCYTES NFR BLD AUTO: 0.3 % — SIGNIFICANT CHANGE UP (ref 0–0.9)
INR BLD: 1.02 RATIO — SIGNIFICANT CHANGE UP (ref 0.85–1.18)
LYMPHOCYTES # BLD AUTO: 1.67 K/UL — SIGNIFICANT CHANGE UP (ref 1–3.3)
LYMPHOCYTES # BLD AUTO: 16.2 % — SIGNIFICANT CHANGE UP (ref 13–44)
MCHC RBC-ENTMCNC: 30.9 PG — SIGNIFICANT CHANGE UP (ref 27–34)
MCHC RBC-ENTMCNC: 31.6 GM/DL — LOW (ref 32–36)
MCV RBC AUTO: 97.7 FL — SIGNIFICANT CHANGE UP (ref 80–100)
MONOCYTES # BLD AUTO: 1 K/UL — HIGH (ref 0–0.9)
MONOCYTES NFR BLD AUTO: 9.7 % — SIGNIFICANT CHANGE UP (ref 2–14)
NEUTROPHILS # BLD AUTO: 6.55 K/UL — SIGNIFICANT CHANGE UP (ref 1.8–7.4)
NEUTROPHILS NFR BLD AUTO: 63.8 % — SIGNIFICANT CHANGE UP (ref 43–77)
NRBC # BLD: 0 /100 WBCS — SIGNIFICANT CHANGE UP (ref 0–0)
PLATELET # BLD AUTO: 189 K/UL — SIGNIFICANT CHANGE UP (ref 150–400)
POTASSIUM SERPL-MCNC: 5.2 MMOL/L — SIGNIFICANT CHANGE UP (ref 3.5–5.3)
POTASSIUM SERPL-SCNC: 5.2 MMOL/L — SIGNIFICANT CHANGE UP (ref 3.5–5.3)
PROT SERPL-MCNC: 8.1 G/DL — SIGNIFICANT CHANGE UP (ref 6–8.3)
PROTHROM AB SERPL-ACNC: 10.7 SEC — SIGNIFICANT CHANGE UP (ref 9.5–13)
RBC # BLD: 3.85 M/UL — LOW (ref 4.2–5.8)
RBC # FLD: 14.3 % — SIGNIFICANT CHANGE UP (ref 10.3–14.5)
SODIUM SERPL-SCNC: 136 MMOL/L — SIGNIFICANT CHANGE UP (ref 135–145)
SURGICAL PATHOLOGY STUDY: SIGNIFICANT CHANGE UP
WBC # BLD: 10.28 K/UL — SIGNIFICANT CHANGE UP (ref 3.8–10.5)
WBC # FLD AUTO: 10.28 K/UL — SIGNIFICANT CHANGE UP (ref 3.8–10.5)

## 2024-09-16 PROCEDURE — 99284 EMERGENCY DEPT VISIT MOD MDM: CPT

## 2024-09-16 PROCEDURE — 85610 PROTHROMBIN TIME: CPT

## 2024-09-16 PROCEDURE — 80053 COMPREHEN METABOLIC PANEL: CPT

## 2024-09-16 PROCEDURE — 85025 COMPLETE CBC W/AUTO DIFF WBC: CPT

## 2024-09-16 PROCEDURE — 99285 EMERGENCY DEPT VISIT HI MDM: CPT

## 2024-09-16 PROCEDURE — 85730 THROMBOPLASTIN TIME PARTIAL: CPT

## 2024-09-16 NOTE — ED PROVIDER NOTE - RAPID ASSESSMENT
This is a 53 y/o M with PMHx of ESRD on HD, HTN and HLD who presents for bleeding from right chest permacath site. Patient underwent L arm AV fistula revision w/ Dr. Arceo on 9/11. He presented yesterday for bleeding to the left fistula site but notes that the dressing for the right permacath site also had to be changed. notes bleeding to the chest site since yesterday. has not changed dressing since it  began. on asa 81. no syncope, light headedness, palpitations. no black or bloody stools.  Last HD session 2 days ago from right chest cath.

## 2024-09-16 NOTE — ED PROVIDER NOTE - OBJECTIVE STATEMENT
54 year old male with pmxh of ESRD on HD, htn, hld, bleeding nightly from R permicath site that is being accessed during the week forHD. left arm av fistual evision on 9/11. presented yesterday and R permcath was dressed and sent out. Patient spoke with Dr. Adis yin and advised to come in for bleeding to be dressed again. no cp, no sob, no dizziness. last HD 2 days ago with no difficulty. Saw Dr. Arceo two days ago and had dressing changed.

## 2024-09-16 NOTE — ED PROVIDER NOTE - CLINICAL SUMMARY MEDICAL DECISION MAKING FREE TEXT BOX
Arturo: Patient bleeding from R chest wall permicath.   · Physical Examination: R chest wall permicath, oozing through bandages.   + left av fistula present with no bleeding.  · CONSTITUTIONAL: Well appearing, awake, alert, oriented to person, place, time/situation and in no apparent distress.  · EYES: Clear bilaterally, pupils equal, round and reactive to light.  · CARDIAC: Normal rate, regular rhythm.  Heart sounds S1, S2.  No murmurs, rubs or gallops.  · GASTROINTESTINAL: Abdomen soft, non-tender, no guarding.  · MUSCULOSKELETAL: Spine appears normal, range of motion is not limited, no muscle or joint tenderness  plan: will speak with vascular, labs, assess for anemia, likely to dress permicath. has f/u with Dr. Arceo in am.

## 2024-09-16 NOTE — CONSULT NOTE ADULT - ASSESSMENT
54M with PMH of HTN, HLD, and ESRD on HD currently through [facility] and now s/p LUE AVF aneurysm repair on 9/11 having oozing from permacath site.     Plan:   - Dressing changed after direct pressure held. Hemostatic  - Follow up with Dr. Arceo in office 9/17    Discussed with Dr. Montano on behalf of Dr. Arceo  Vascular [facility]   [id number] / [phone]

## 2024-09-16 NOTE — CONSULT NOTE ADULT - SUBJECTIVE AND OBJECTIVE BOX
VASCULAR SURGERY CONSULT NOTE  --------------------------------------------------------------------------------------------  HPI:  54M with PMH of HTN, HLD, and ESRD on HD currently through [facility] and now s/p LUE AVF aneurysm repair on 9/11 presenting after having bleeding episodes from the fistula site. Reports on Thursday night he had some dressing saturation with blood, the dialysis center changed the dressing and did not find anything concerning, however, patient reports had three additional episodes of bleeding from the permacath. He was last in ED for this 9/15. States bleeding episodes only occur at night. Denies fever, chills, palpitations, lightheadedness, dizziness.     In ED, patient is afebrile, hemodynamically stable.    ROS: 10-system review is otherwise negative except HPI above.      PAST MEDICAL & SURGICAL HISTORY:  ESRD on dialysis    HTN (hypertension)    HLD (hyperlipidemia)    S/P arteriovenous (AV) fistula creation    H/O colonoscopy  --------------------------------------------------------------------------------------------  Vitals:   T(C): 36.6 (09-16-24 @ 22:33), Max: 36.7 (09-16-24 @ 16:41)  HR: 71 (09-16-24 @ 22:33) (70 - 72)  BP: 183/92 (09-16-24 @ 22:33) (171/83 - 183/92)  RR: 20 (09-16-24 @ 22:33) (16 - 20)  SpO2: 100% (09-16-24 @ 22:33) (97% - 100%)  CAPILLARY BLOOD GLUCOSE    Height (cm): 172.7 (09-16 @ 16:41)  Weight (kg): 90 (09-16 @ 16:41)  BMI (kg/m2): 30.2 (09-16 @ 16:41)  BSA (m2): 2.04 (09-16 @ 16:41)    PHYSICAL EXAM:  General: NAD, Lying in bed comfortably  Neuro: Alert and answering questions appropriately   Resp: Good effort, no signs of respiratory distress  Vascular: All 4 extremities warm, palpable thrill at LUE AVF, 2+ LUE radial, no active bleeding, small amount of sanguinous saturation on dressing around permacath  Musculoskeletal: All 4 extremities moving spontaneously, no limitations  --------------------------------------------------------------------------------------------  LABS  CBC (09-16 @ 18:27)                              11.9<L>                         10.28   )----------------(  189        63.8  % Neutrophils, 16.2  % Lymphocytes, ANC: 6.55                                37.6<L>    BMP (09-16 @ 18:27)             136     |  94<L>   |  81<H> 		Ca++ --      Ca 11.6<H>             ---------------------------------( 84    		Mg --                 5.2     |  26      |  13.62<H>			Ph --        LFTs (09-16 @ 18:27)      TPro 8.1 / Alb 4.1 / TBili 0.7 / DBili -- / AST 19 / ALT 16 / AlkPhos 100    Coags (09-16 @ 18:27)  aPTT 29.6 / INR 1.02 / PT 10.7  --------------------------------------------------------------------------------------------  MICROBIOLOGY  Urinalysis (09-16 @ 18:27):     Color:  / Appearance:  / SG:  / pH:  / Gluc: 84 / Ketones:  / Bili:  / Urobili:  / Protein : / Nitrites:  / Leuk.Est:  / RBC:  / WBC:  / Sq Epi:  / Non Sq Epi:  / Bacteria      --------------------------------------------------------------------------------------------  ACC: [id number] EXAM:  DUPLEX HEMODIALYSIS ACCESS LT   ORDERED BY:  MELISSA GIRALDO     PROCEDURE DATE:  09/15/2024      INTERPRETATION:  CLINICAL INFORMATION: Assess AV fistula    TECHNIQUE: Hemodialysis access duplex ultrasound    COMPARISON: None available    FINDINGS:    There is a surgically created left upper extremity arteriovenous fistula   between the brachial artery and cephalic vein with anastomosis at the   upper arm    The fistula is patent. Waveforms are compatible with patency of the   fistula.    Velocity measurements are tabulated below.    Brachial Artery Inflow Pre-Anastomosis:  265 cm/s at anastomosis  257 cm/s Post- Anastomosis    Anastomosis:    Proximal Outflow Vein, 1cm from anastomosis: 337 cm/s  Mid Outflow Vein, 2cmfrom anastomosis: 71 cm/s  Distal Outflow Vein, 4cm from anastomosis: 63 cm/s  Axillary and Subclavian Veins: Cephalic arch stent. At the level of the   stent velocity measures 33 cm/s    Flow volumes as follows:  4.4 L/m Inflow:  2.0 L/m Outflow:    Miscellaneous: Ill-defined hypoechoic collection abuts the outflow vein   measures 0.8 x 1.5 x 0.9 cm. There is no color Doppler signal suggests   pseudoaneurysm or fistula. This finding likely represents small hematoma.    IMPRESSION:  1.  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 There is associated increased velocity at the proximal   outflow anastomosis measuring up to 337 cm/s with decreased velocity   distally.  2.  Ill-defined hypoechoic collection likely relating small hematoma   abutting the outflow vein.    --- End of Report ---    JAIDEN MONTOYA DO; Resident Radiologist  This document has been electronically signed.  MONICA MART MD; Attending Radiologist  This document has been electronically signed. Sep 15 2024  4:31PM

## 2024-09-16 NOTE — ED ADULT NURSE NOTE - OBJECTIVE STATEMENT
53 y/o M presents to the ED with complaints of bleeding from dialysis catheter. Pt states that catheter has been bleeding since last tuesday when it was placed. Pt states that he was sent in to get dressing changed and that he has an appointment with his MD regarding the dialysis catheter tomorrow. Pt denies dizziness, lightheadedness, chest pain. Pt A&Ox3 gross neuro intact, no difficulty speaking in complete sentences, pulses x 4, michel x4, blood noted on dressing site at R upper chest wall, ED attending at bedside assessing pt

## 2024-09-16 NOTE — ED PROVIDER NOTE - PATIENT PORTAL LINK FT
You can access the FollowMyHealth Patient Portal offered by [facility] by registering at the following website: [email or web address]. By joining [facility]’s FollowMyHealth portal, you will also be able to view your health information using other applications (apps) compatible with our system.

## 2024-09-18 ENCOUNTER — APPOINTMENT (OUTPATIENT)
Dept: ENDOVASCULAR SURGERY | Facility: CLINIC | Age: 54
End: 2024-09-18

## 2024-09-24 ENCOUNTER — APPOINTMENT (OUTPATIENT)
Dept: VASCULAR SURGERY | Facility: CLINIC | Age: 54
End: 2024-09-24
Payer: MEDICAID

## 2024-09-24 VITALS
HEIGHT: 68 IN | DIASTOLIC BLOOD PRESSURE: 64 MMHG | SYSTOLIC BLOOD PRESSURE: 147 MMHG | BODY MASS INDEX: 30.07 KG/M2 | HEART RATE: 72 BPM | WEIGHT: 198.41 LBS

## 2024-09-24 PROCEDURE — 99024 POSTOP FOLLOW-UP VISIT: CPT

## 2024-09-25 NOTE — REASON FOR VISIT
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  He has no complaints.  He is currently on hemodialysis with permacath.  The wound is clean and dry.  Patient will return in 2 weeks and we will perform a duplex study.

## 2024-10-08 ENCOUNTER — APPOINTMENT (OUTPATIENT)
Dept: VASCULAR SURGERY | Facility: CLINIC | Age: 54
End: 2024-10-08
Payer: MEDICAID

## 2024-10-08 PROCEDURE — 93990 DOPPLER FLOW TESTING: CPT

## 2024-10-08 PROCEDURE — 99024 POSTOP FOLLOW-UP VISIT: CPT

## 2024-10-18 ENCOUNTER — APPOINTMENT (OUTPATIENT)
Dept: ENDOVASCULAR SURGERY | Facility: CLINIC | Age: 54
End: 2024-10-18

## 2024-10-18 PROCEDURE — 36589 REMOVAL TUNNELED CV CATH: CPT | Mod: RT,58

## 2025-01-09 NOTE — ED PROVIDER NOTE - DISPOSITION TYPE
Airway  Date/Time: 1/9/2025 12:58 PM  Airway not difficult    General Information and Staff    Patient location during procedure: OR  CRNA/CAA: Kayleigh Wagner CRNA    Indications and Patient Condition  Indications for airway management: airway protection    Preoxygenated: yes  Mask difficulty assessment: 2 - vent by mask + OA or adjuvant +/- NMBA    Final Airway Details  Final airway type: endotracheal airway      Successful airway: ETT  Cuffed: yes   Successful intubation technique: direct laryngoscopy  Facilitating devices/methods: intubating stylet  Endotracheal tube insertion site: oral  Blade: Deedee  Blade size: 3.5  ETT size (mm): 8.5  Cormack-Lehane Classification: grade IIa - partial view of glottis  Placement verified by: chest auscultation   Cuff volume (mL): 7  Measured from: teeth  ETT/EBT  to teeth (cm): 21  Number of attempts at approach: 1  Assessment: lips, teeth, and gum same as pre-op and atraumatic intubation          
DISCHARGE

## 2025-01-16 ENCOUNTER — NON-APPOINTMENT (OUTPATIENT)
Age: 55
End: 2025-01-16

## 2025-01-16 ENCOUNTER — APPOINTMENT (OUTPATIENT)
Dept: VASCULAR SURGERY | Facility: CLINIC | Age: 55
End: 2025-01-16
Payer: MEDICAID

## 2025-01-16 PROCEDURE — 93990 DOPPLER FLOW TESTING: CPT

## 2025-01-16 PROCEDURE — 99213 OFFICE O/P EST LOW 20 MIN: CPT

## 2025-05-20 ENCOUNTER — APPOINTMENT (OUTPATIENT)
Dept: VASCULAR SURGERY | Facility: CLINIC | Age: 55
End: 2025-05-20

## (undated) DEVICE — SOL INJ NS 0.9% 500ML 1-PORT

## (undated) DEVICE — MEDICATION LABELS W MARKER

## (undated) DEVICE — SUT SOFSILK 2-0 18" TIES

## (undated) DEVICE — DRSG TEGADERM CHG 4X6-1/8"

## (undated) DEVICE — DRAPE LIGHT HANDLE COVER (BLUE)

## (undated) DEVICE — GOWN XL

## (undated) DEVICE — SOL IRR POUR NS 0.9% 500ML

## (undated) DEVICE — DRSG TEGADERM 6"X8"

## (undated) DEVICE — STAPLER SKIN VISI-STAT 35 WIDE

## (undated) DEVICE — DRSG OPSITE 13.75 X 4"

## (undated) DEVICE — VESSEL LOOP MAXI-BLUE 0.120" X 16"

## (undated) DEVICE — VENODYNE/SCD SLEEVE CALF MEDIUM

## (undated) DEVICE — DRSG COBAN 4"

## (undated) DEVICE — GLV 6.5 PROTEXIS (WHITE)

## (undated) DEVICE — SUT SILK 3-0 18" TIES

## (undated) DEVICE — GLV 7.5 PROTEXIS (WHITE)

## (undated) DEVICE — SPECIMEN CONTAINER 100ML

## (undated) DEVICE — PREP CHLORAPREP HI-LITE ORANGE 26ML

## (undated) DEVICE — SUT BIOSYN 4-0 18" P-12

## (undated) DEVICE — SOL IRR POUR H2O 250ML

## (undated) DEVICE — SUT POLYSORB 3-0 30" V-20 UNDYED

## (undated) DEVICE — POSITIONER FOAM EGG CRATE ULNAR 2PCS (PINK)

## (undated) DEVICE — BLADE SCALPEL SAFETYLOCK #11

## (undated) DEVICE — BLADE SCALPEL SAFETYLOCK #15

## (undated) DEVICE — CLAMP BULLDOG MINI STRAIGHT (GREEN) DISP

## (undated) DEVICE — DRSG STERISTRIPS 0.5 X 4"

## (undated) DEVICE — DRAPE TOWEL BLUE 17" X 24"

## (undated) DEVICE — CLAMP BULLDOG MIDI 45 DEGREE (GREEN) DISP

## (undated) DEVICE — PACK AV FISTULA

## (undated) DEVICE — SUT PROLENE 6-0 4-30" BV-1

## (undated) DEVICE — CLAMP BULLDOG MIDI STRAIGHT (YELLOW) DISP

## (undated) DEVICE — LAP PAD 18 X 18"

## (undated) DEVICE — DRSG BIOPATCH DISK W CHG 1" W 4.0MM HOLE

## (undated) DEVICE — DRSG COBAN 6"

## (undated) DEVICE — WARMING BLANKET LOWER ADULT

## (undated) DEVICE — DRAPE INSTRUMENT POUCH 6.75" X 11"

## (undated) DEVICE — VESSEL LOOP MINI-BLUE 0.075" X 16"

## (undated) DEVICE — SUCTION YANKAUER NO CONTROL VENT

## (undated) DEVICE — STAPLER COVIDIEN ENDO GIA SHORT HANDLE

## (undated) DEVICE — SUT SOFSILK 4-0 18" TIES